# Patient Record
Sex: MALE
[De-identification: names, ages, dates, MRNs, and addresses within clinical notes are randomized per-mention and may not be internally consistent; named-entity substitution may affect disease eponyms.]

---
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
## 2020-06-15 ENCOUNTER — APPOINTMENT (OUTPATIENT)
Dept: ORTHOPEDIC SURGERY | Facility: CLINIC | Age: 20
End: 2020-06-15
Payer: MEDICAID

## 2020-06-15 VITALS
HEART RATE: 104 BPM | BODY MASS INDEX: 22.22 KG/M2 | DIASTOLIC BLOOD PRESSURE: 83 MMHG | SYSTOLIC BLOOD PRESSURE: 141 MMHG | HEIGHT: 69 IN | WEIGHT: 150 LBS

## 2020-06-15 VITALS — TEMPERATURE: 98.7 F

## 2020-06-15 DIAGNOSIS — M22.42 CHONDROMALACIA PATELLAE, LEFT KNEE: ICD-10-CM

## 2020-06-15 DIAGNOSIS — M25.562 PAIN IN LEFT KNEE: ICD-10-CM

## 2020-06-15 DIAGNOSIS — Z78.9 OTHER SPECIFIED HEALTH STATUS: ICD-10-CM

## 2020-06-15 PROCEDURE — 99203 OFFICE O/P NEW LOW 30 MIN: CPT

## 2020-06-15 PROCEDURE — 73562 X-RAY EXAM OF KNEE 3: CPT | Mod: LT

## 2020-06-15 NOTE — PHYSICAL EXAM
[Normal] : Gait: normal [Antalgic] : not antalgic [de-identified] : GENERAL APPEARANCE: Well nourished and hydrated, pleasant, alert, and oriented x 3. Appears their stated age. \par HEENT: Normocephalic, extraocular eye motion intact. Nasal septum midline. Oral cavity clear. External auditory canal clear. \par RESPIRATORY: Breath sounds clear and audible in all lobes. No wheezing, No accessory muscle use.\par CARDIOVASCULAR: No apparent abnormalities. No lower leg edema. No varicosities. Pedal pulses are palpable.\par NEUROLOGIC: Sensation is normal, no muscle weakness in the upper or lower extremities.\par DERMATOLOGIC: No apparent skin lesions, moist, warm, no rash.\par SPINE: Cervical spine appears normal and moves freely; thoracic spine appears normal and moves freely; lumbosacral spine appears normal and moves freely, normal, nontender.\par MUSCULOSKELETAL: Hands, wrists, and elbows are normal and move freely, shoulders are normal and move freely.  [de-identified] : Right knee exam shows mild patellofemoral crepitus, pain with patellar grind. Normal ROM, no effusion, no deformity [de-identified] : 3 view left knee x-rays demonstrate a well-preserved joints without findings of any evidence of fracture, dislocation,\par  or any other acute orthopedic pathology. There is No radiographic features of OA are present.

## 2020-06-15 NOTE — DISCUSSION/SUMMARY
[de-identified] : 19-year-old male with a 5 week history of left knee pain. His physical examination and x-rays are reassuring. We'll begin physical therapy for what appears to be patellofemoral pain syndrome. We will begin formal physical therapy; if he doesn't improve after 4-6 weeks of therapy we will consider ordering an MRI. Patient will continue with light exercise as tolerated and use over-the-counter anti-inflammatories as needed.

## 2020-06-15 NOTE — HISTORY OF PRESENT ILLNESS
[Walking] : walking [Standing] : standing [Constant] : ~He/She~ states the symptoms seem to be constant [Rest] : relieved by rest [3] : a current pain level of 3/10 [de-identified] : 18 y/o male with left knee pain for 5 weeks without injury. He reports the pain severity is 3/10 and is constant.Hhe notes the pain in the back of the knee or around knee cap. Pt states that he has seen some swelling. He states that he typically feels the pain when he is walking/running or when he tries to bend the knee quickly. \par  [de-identified] : long walking

## 2020-06-15 NOTE — END OF VISIT
[FreeTextEntry3] : I, Geovanna Cardona, acted solely as a scribe for Dr. Bruce Lehman on this date 06/15/2020 .

## 2024-05-03 ENCOUNTER — OFFICE (OUTPATIENT)
Dept: URBAN - METROPOLITAN AREA CLINIC 115 | Facility: CLINIC | Age: 24
Setting detail: OPHTHALMOLOGY
End: 2024-05-03
Payer: MEDICAID

## 2024-05-03 DIAGNOSIS — H52.13: ICD-10-CM

## 2024-05-03 DIAGNOSIS — H16.223: ICD-10-CM

## 2024-05-03 PROCEDURE — 99203 OFFICE O/P NEW LOW 30 MIN: CPT | Performed by: OPTOMETRIST

## 2024-05-03 PROCEDURE — 92015 DETERMINE REFRACTIVE STATE: CPT | Performed by: OPTOMETRIST
